# Patient Record
Sex: FEMALE | Race: WHITE | Employment: OTHER | URBAN - METROPOLITAN AREA
[De-identification: names, ages, dates, MRNs, and addresses within clinical notes are randomized per-mention and may not be internally consistent; named-entity substitution may affect disease eponyms.]

---

## 2019-10-14 NOTE — TELEPHONE ENCOUNTER
ONCOLOGY INTAKE: Records Information      APPT INFORMATION:  Referring provider:  Dr. Nell Palacio  Referring provider s clinic:  [facility]  Reason for visit/diagnosis:  Malignant neoplasm of urinary bladder  Has patient been notified of appointment date and time?: yes, per pt    RECORDS INFORMATION:  Were the records received with the referral (via [employer])? no    Has patient been seen for any external appt for this diagnosis? yes    If yes, where? [facility]    Has patient had any imaging or procedures outside of [place] for this condition? yes      If Yes, where? [facility]    ADDITIONAL INFORMATION:  Notes care everywhere

## 2019-11-01 ENCOUNTER — OFFICE VISIT (OUTPATIENT)
Dept: UROLOGY | Facility: CLINIC | Age: 79
End: 2019-11-01
Payer: COMMERCIAL

## 2019-11-01 VITALS
DIASTOLIC BLOOD PRESSURE: 60 MMHG | SYSTOLIC BLOOD PRESSURE: 150 MMHG | BODY MASS INDEX: 21.01 KG/M2 | HEIGHT: 60 IN | WEIGHT: 107 LBS | HEART RATE: 86 BPM | OXYGEN SATURATION: 97 %

## 2019-11-01 DIAGNOSIS — C67.9 MALIGNANT NEOPLASM OF URINARY BLADDER, UNSPECIFIED SITE (H): Primary | ICD-10-CM

## 2019-11-01 PROBLEM — I77.9 CAROTID ARTERY DISEASE (H): Status: ACTIVE | Noted: 2018-10-22

## 2019-11-01 LAB
ALBUMIN UR-MCNC: NEGATIVE MG/DL
APPEARANCE UR: CLEAR
BILIRUB UR QL STRIP: NEGATIVE
COLOR UR AUTO: YELLOW
GLUCOSE UR STRIP-MCNC: NEGATIVE MG/DL
HGB UR QL STRIP: NEGATIVE
KETONES UR STRIP-MCNC: NEGATIVE MG/DL
LEUKOCYTE ESTERASE UR QL STRIP: NEGATIVE
NITRATE UR QL: NEGATIVE
PH UR STRIP: 5.5 PH (ref 5–7)
SOURCE: NORMAL
SP GR UR STRIP: 1.01 (ref 1–1.03)
UROBILINOGEN UR STRIP-ACNC: 0.2 EU/DL (ref 0.2–1)

## 2019-11-01 PROCEDURE — 81003 URINALYSIS AUTO W/O SCOPE: CPT | Performed by: UROLOGY

## 2019-11-01 PROCEDURE — 99204 OFFICE O/P NEW MOD 45 MIN: CPT | Performed by: UROLOGY

## 2019-11-01 PROCEDURE — 88112 CYTOPATH CELL ENHANCE TECH: CPT | Performed by: UROLOGY

## 2019-11-01 RX ORDER — FLUTICASONE PROPIONATE 50 MCG
2 SPRAY, SUSPENSION (ML) NASAL
COMMUNITY
Start: 2019-09-04

## 2019-11-01 RX ORDER — CLOPIDOGREL BISULFATE 75 MG/1
75 TABLET ORAL
COMMUNITY
Start: 2019-06-25

## 2019-11-01 RX ORDER — ATORVASTATIN CALCIUM 40 MG/1
40 TABLET, FILM COATED ORAL
COMMUNITY
Start: 2018-07-27

## 2019-11-01 RX ORDER — OMEPRAZOLE 10 MG/1
10 CAPSULE, DELAYED RELEASE ORAL
COMMUNITY
Start: 2015-02-17

## 2019-11-01 RX ORDER — SPIRONOLACTONE 25 MG/1
25 TABLET ORAL
COMMUNITY
Start: 2018-10-22

## 2019-11-01 RX ORDER — DIPHENOXYLATE HYDROCHLORIDE AND ATROPINE SULFATE 2.5; .025 MG/1; MG/1
1 TABLET ORAL
COMMUNITY
Start: 2012-12-07

## 2019-11-01 RX ORDER — IRBESARTAN 150 MG/1
150 TABLET ORAL
COMMUNITY
Start: 2019-07-18

## 2019-11-01 RX ORDER — ALBUTEROL SULFATE 0.83 MG/ML
2.5 SOLUTION RESPIRATORY (INHALATION)
COMMUNITY
Start: 2018-06-12

## 2019-11-01 RX ORDER — POTASSIUM CHLORIDE 750 MG/1
10 TABLET, EXTENDED RELEASE ORAL 2 TIMES DAILY
COMMUNITY
Start: 2019-05-28

## 2019-11-01 RX ORDER — OXYCODONE AND ACETAMINOPHEN 5; 325 MG/1; MG/1
1 TABLET ORAL EVERY 6 HOURS PRN
COMMUNITY
Start: 2019-01-02

## 2019-11-01 SDOH — HEALTH STABILITY: MENTAL HEALTH: HOW OFTEN DO YOU HAVE A DRINK CONTAINING ALCOHOL?: NEVER

## 2019-11-01 ASSESSMENT — PAIN SCALES - GENERAL: PAINLEVEL: NO PAIN (0)

## 2019-11-01 ASSESSMENT — PATIENT HEALTH QUESTIONNAIRE - PHQ9: SUM OF ALL RESPONSES TO PHQ QUESTIONS 1-9: 10

## 2019-11-01 ASSESSMENT — MIFFLIN-ST. JEOR: SCORE: 871.85

## 2019-11-01 NOTE — NURSING NOTE
Chief Complaint   Patient presents with     Other     patient is here to discuss malignant bladder neoplasm.      Vanessa Sahu, CMA

## 2019-11-01 NOTE — PROGRESS NOTES
Chief Complaint:   Bladder Cancer         Consult or Referral:     Mr. Mireille Velázquez is a 79 year old female seen at the request of Dr. Palacio.         History of Present Illness:    Mireille Velázquez is a very pleasant 79 year old female who presents with a history of bladder cancer. She has a long history of this disease, initially diagnosed with 2-3 cm bladder tumor by Dr. Duran in 2010. Had non-invasive disease and treated with intravesical mitomycin. Subsequently had several recurrences and treated with induction BCG, and what patient describes as maintenance BCG.  In 2016, had tumor recur at left UVJ. More recently, was noted in May 2018 to have recurrent bladder tumors which were HG Ta. Also had left ureteroscopy which demonstrated LG Ta tumor in the lower pole. Induction BCG completed 11/2018. She has since gotten opinions from [facility], and has followed with Dr. Steele.    Most recently, had TURBT 8/2019 with HG Ta tumors in the bladder, and low-grade tumor in lower pole of left kidney. Has not had further intravesical treatments since that time. Dr. Steele was recommending further TURBT and left ureteroscopy, but patient has not had follow-up since 9/4/2019.    She reports no recent hematuria, dysuria, abdominal pain, or flank pain.         Past Medical History:     Past Medical History:   Diagnosis Date     Mumps     as child    Bladder Cancer  CHF  CAD  HTN  COPD  TIA  Prolonged QT syndrome         Past Surgical History:     Past Surgical History:   Procedure Laterality Date     BLADDER SURGERY       CYSTOSCOPY     appendectomy  Hysterectomy  Rotator cuff repair         Medications     Current Outpatient Medications   Medication     albuterol (PROVENTIL) (2.5 MG/3ML) 0.083% neb solution     aspirin (ASA) 325 MG EC tablet     atorvastatin (LIPITOR) 40 MG tablet     carboxymethylcellul-glycerin (OPTIVE) 0.5-0.9 % SOLN ophthalmic solution     Cholecalciferol (VITAMIN D3) 25 MCG (1000 UT)  CAPS     clopidogrel (PLAVIX) 75 MG tablet     fluticasone (FLONASE) 50 MCG/ACT nasal spray     irbesartan (AVAPRO) 150 MG tablet     magnesium chloride 535 (64 Mg) MG TBEC CR tablet     Multiple Vitamin (MULTI-VITAMINS) TABS     nebulizer nebulization     omeprazole (PRILOSEC) 10 MG DR capsule     oxyCODONE-acetaminophen (PERCOCET) 5-325 MG tablet     potassium chloride ER (K-TAB/KLOR-CON) 10 MEQ CR tablet     spironolactone (ALDACTONE) 25 MG tablet     No current facility-administered medications for this visit.           Family History:   Mother with bladder cancer  No other known  malignancy         Social History:     Social History     Socioeconomic History     Marital status: Single     Spouse name: Not on file     Number of children: Not on file     Years of education: Not on file     Highest education level: Not on file   Occupational History     Not on file   Social Needs     Financial resource strain: Not on file     Food insecurity:     Worry: Not on file     Inability: Not on file     Transportation needs:     Medical: Not on file     Non-medical: Not on file   Tobacco Use     Smoking status: Current Every Day Smoker     Packs/day: 0.00     Smokeless tobacco: Never Used   Substance and Sexual Activity     Alcohol use: Never     Frequency: Never     Drug use: Never     Sexual activity: Not on file   Lifestyle     Physical activity:     Days per week: Not on file     Minutes per session: Not on file     Stress: Not on file   Relationships     Social connections:     Talks on phone: Not on file     Gets together: Not on file     Attends [employer] service: Not on file     Active member of club or organization: Not on file     Attends meetings of clubs or organizations: Not on file     Relationship status: Not on file     Intimate partner violence:     Fear of current or ex partner: Not on file     Emotionally abused: Not on file     Physically abused: Not on file     Forced sexual activity: Not on file    Other Topics Concern     Parent/sibling w/ CABG, MI or angioplasty before 65F 55M? Not Asked   Social History Narrative     Not on file          Allergies:   Augmentin and Doxycycline         Review of Systems:  From intake questionnaire     Skin: negative  Eyes: negative  Ears/Nose/Throat: negative  Respiratory: No shortness of breath, dyspnea on exertion, cough, or hemoptysis  Cardiovascular: No chest pain or palpitations  Gastrointestinal: negative; no nausea/vomiting, constipation or diarrhea  Genitourinary: as per HPI  Musculoskeletal: negative  Neurologic: negative  Psychiatric: negative  Hematologic/Lymphatic/Immunologic: negative  Endocrine: negative         Physical Exam:     Patient is a 79 year old  female   Vitals: Blood pressure (!) 150/60, pulse 86, height 1.524 m (5'), weight 48.5 kg (107 lb), SpO2 97 %.  Constitutional: Body mass index is 20.9 kg/m .  Alert, no acute distress, oriented, conversant  Eyes: no scleral icterus; extraocular muscles intact, moist conjunctivae  Neck: trachea midline, no thyromegaly  Ears/nose/mouth: throat/mouth:normal, good dentition  Respiratory: no respiratory distress, or pursed lip breathing  Cardiovascular: pulses strong and intact; no obvious jugular venous distension present  Gastrointestinal: soft, nontender, no organomegaly or masses,   Lymphatics: No inguinal adenopathy  Musculoskeletal: extremities normal, no peripheral edema  Skin: no suspicious lesions or rashes  Neuro: Alert, oriented, speech and mentation normal  Psych: affect and mood normal, alert and oriented to person, place and time  Gait: Normal      Labs and Pathology:    I reviewed all applicable laboratory and pathology data and went over findings with patient  Significant for   Lab Results   Component Value Date    CR 0.75 12/18/2005       Imaging:    I reviewed all applicable imaging and went over findings with patient.    CT Urogram 6/6/2019  IMPRESSION:  Since the CT urogram of 2/1/2019, a 4mm  polypoid enhancing lesion  has developed in the right posterior bladder near the right ureterovesical  junction worrisome for a small urothelial neoplasm. Correlation with cystoscopic  findings will be helpful in further evaluation. The intrarenal collecting  systems and ureters remain normal.      Outside and Past Medical records:    I spent 10 minutes reviewing outside and past medical records.         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 Has previously had BCG, but last was approximately 1 year ago. We discussed this today and options for ongoing treatment. At this point, recommend CT urogram and office cystoscopy to assess her current status of disease. She may require TURBT and/or ureteroscopy pending these results. We will also work to obtain more detailed records of her BCG history, as she may benefit from additional BCG, but may also be a candidate for clinical trial of systemic immunotherapy. All of this was discussed in detail today. She and her daughter-in-law are in agreement with plan.    Plan:  CT Urogram  Obtain outside records  Follow-up with office cystoscopy     Orders  Orders Placed This Encounter   Procedures     CT Urogram wo & w Contrast     UA without Microscopic [[id number]]     Cytology non gyn [[id number]]     Hemanth Davila MD  Urology  [facility]  Clinic Phone [phone]

## 2019-11-01 NOTE — LETTER
11/1/2019     RE: Mireille Velázquez  [address]  [address]  [place] [address]     Dear Colleague,    Thank you for referring your patient, Mireille Velázquez, to the [facility] [place] at [facility]. Please see a copy of my visit note below.          Chief Complaint:   Bladder Cancer         Consult or Referral:     Mr. Mireille Velázquez is a 79 year old female seen at the request of Dr. Palacio.         History of Present Illness:    Mireille Velázquez is a very pleasant 79 year old female who presents with a history of bladder cancer. She has a long history of this disease, initially diagnosed with 2-3 cm bladder tumor by Dr. Duran in 2010. Had non-invasive disease and treated with intravesical mitomycin. Subsequently had several recurrences and treated with induction BCG, and what patient describes as maintenance BCG.  In 2016, had tumor recur at left UVJ. More recently, was noted in May 2018 to have recurrent bladder tumors which were HG Ta. Also had left ureteroscopy which demonstrated LG Ta tumor in the lower pole. Induction BCG completed 11/2018. She has since gotten opinions from [facility], and has followed with Dr. Steele.    Most recently, had TURBT 8/2019 with HG Ta tumors in the bladder, and low-grade tumor in lower pole of left kidney. Has not had further intravesical treatments since that time. Dr. Steele was recommending further TURBT and left ureteroscopy, but patient has not had follow-up since 9/4/2019.    She reports no recent hematuria, dysuria, abdominal pain, or flank pain.         Past Medical History:     Past Medical History:   Diagnosis Date     Mumps     as child    Bladder Cancer  CHF  CAD  HTN  COPD  TIA  Prolonged QT syndrome         Past Surgical History:     Past Surgical History:   Procedure Laterality Date     BLADDER SURGERY       CYSTOSCOPY     appendectomy  Hysterectomy  Rotator cuff repair          Medications     Current Outpatient Medications   Medication     albuterol (PROVENTIL) (2.5 MG/3ML) 0.083% neb solution     aspirin (ASA) 325 MG EC tablet     atorvastatin (LIPITOR) 40 MG tablet     carboxymethylcellul-glycerin (OPTIVE) 0.5-0.9 % SOLN ophthalmic solution     Cholecalciferol (VITAMIN D3) 25 MCG (1000 UT) CAPS     clopidogrel (PLAVIX) 75 MG tablet     fluticasone (FLONASE) 50 MCG/ACT nasal spray     irbesartan (AVAPRO) 150 MG tablet     magnesium chloride 535 (64 Mg) MG TBEC CR tablet     Multiple Vitamin (MULTI-VITAMINS) TABS     nebulizer nebulization     omeprazole (PRILOSEC) 10 MG DR capsule     oxyCODONE-acetaminophen (PERCOCET) 5-325 MG tablet     potassium chloride ER (K-TAB/KLOR-CON) 10 MEQ CR tablet     spironolactone (ALDACTONE) 25 MG tablet     No current facility-administered medications for this visit.           Family History:   Mother with bladder cancer  No other known  malignancy         Social History:     Social History     Socioeconomic History     Marital status: Single     Spouse name: Not on file     Number of children: Not on file     Years of education: Not on file     Highest education level: Not on file   Occupational History     Not on file   Social Needs     Financial resource strain: Not on file     Food insecurity:     Worry: Not on file     Inability: Not on file     Transportation needs:     Medical: Not on file     Non-medical: Not on file   Tobacco Use     Smoking status: Current Every Day Smoker     Packs/day: 0.00     Smokeless tobacco: Never Used   Substance and Sexual Activity     Alcohol use: Never     Frequency: Never     Drug use: Never     Sexual activity: Not on file   Lifestyle     Physical activity:     Days per week: Not on file     Minutes per session: Not on file     Stress: Not on file   Relationships     Social connections:     Talks on phone: Not on file     Gets together: Not on file     Attends Nondenominational service: Not on file     Active member of  club or organization: Not on file     Attends meetings of clubs or organizations: Not on file     Relationship status: Not on file     Intimate partner violence:     Fear of current or ex partner: Not on file     Emotionally abused: Not on file     Physically abused: Not on file     Forced sexual activity: Not on file   Other Topics Concern     Parent/sibling w/ CABG, MI or angioplasty before 65F 55M? Not Asked   Social History Narrative     Not on file          Allergies:   Augmentin and Doxycycline         Review of Systems:  From intake questionnaire     Skin: negative  Eyes: negative  Ears/Nose/Throat: negative  Respiratory: No shortness of breath, dyspnea on exertion, cough, or hemoptysis  Cardiovascular: No chest pain or palpitations  Gastrointestinal: negative; no nausea/vomiting, constipation or diarrhea  Genitourinary: as per HPI  Musculoskeletal: negative  Neurologic: negative  Psychiatric: negative  Hematologic/Lymphatic/Immunologic: negative  Endocrine: negative         Physical Exam:     Patient is a 79 year old  female   Vitals: Blood pressure (!) 150/60, pulse 86, height 1.524 m (5'), weight 48.5 kg (107 lb), SpO2 97 %.  Constitutional: Body mass index is 20.9 kg/m .  Alert, no acute distress, oriented, conversant  Eyes: no scleral icterus; extraocular muscles intact, moist conjunctivae  Neck: trachea midline, no thyromegaly  Ears/nose/mouth: throat/mouth:normal, good dentition  Respiratory: no respiratory distress, or pursed lip breathing  Cardiovascular: pulses strong and intact; no obvious jugular venous distension present  Gastrointestinal: soft, nontender, no organomegaly or masses,   Lymphatics: No inguinal adenopathy  Musculoskeletal: extremities normal, no peripheral edema  Skin: no suspicious lesions or rashes  Neuro: Alert, oriented, speech and mentation normal  Psych: affect and mood normal, alert and oriented to person, place and time  Gait: Normal      Labs and Pathology:    I reviewed  all applicable laboratory and pathology data and went over findings with patient  Significant for   Lab Results   Component Value Date    CR 0.75 12/18/2005       Imaging:    I reviewed all applicable imaging and went over findings with patient.    CT Urogram 6/6/2019  IMPRESSION:  Since the CT urogram of 2/1/2019, a 4mm polypoid enhancing lesion  has developed in the right posterior bladder near the right ureterovesical  junction worrisome for a small urothelial neoplasm. Correlation with cystoscopic  findings will be helpful in further evaluation. The intrarenal collecting  systems and ureters remain normal.      Outside and Past Medical records:    I spent 10 minutes reviewing outside and past medical records.         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 Has previously had BCG, but last was approximately 1 year ago. We discussed this today and options for ongoing treatment. At this point, recommend CT urogram and office cystoscopy to assess her current status of disease. She may require TURBT and/or ureteroscopy pending these results. We will also work to obtain more detailed records of her BCG history, as she may benefit from additional BCG, but may also be a candidate for clinical trial of systemic immunotherapy. All of this was discussed in detail today. She and her daughter-in-law are in agreement with plan.    Plan:  CT Urogram  Obtain outside records  Follow-up with office cystoscopy     Orders  Orders Placed This Encounter   Procedures     CT Urogram wo & w Contrast     UA without Microscopic [[id number]]     Cytology non gyn [[id number]]     Hemanth Davila MD  Urology  [facility]  Clinic Phone [phone]    Again, thank you for allowing me to participate in the care of your patient.      Sincerely,    Hemanth Davila MD

## 2019-11-02 ENCOUNTER — HOSPITAL ENCOUNTER (OUTPATIENT)
Dept: CT IMAGING | Facility: CLINIC | Age: 79
Discharge: HOME | End: 2019-11-02
Attending: UROLOGY | Admitting: UROLOGY
Payer: COMMERCIAL

## 2019-11-02 DIAGNOSIS — C67.9 MALIGNANT NEOPLASM OF URINARY BLADDER, UNSPECIFIED SITE (H): ICD-10-CM

## 2019-11-02 LAB
CREAT BLD-MCNC: 0.7 MG/DL (ref 0.52–1.04)
GFR SERPL CREATININE-BSD FRML MDRD: 80 ML/MIN/{1.73_M2}

## 2019-11-02 PROCEDURE — 82565 ASSAY OF CREATININE: CPT

## 2019-11-02 PROCEDURE — 25000125 ZZHC RX 250: Performed by: UROLOGY

## 2019-11-02 PROCEDURE — 25000128 H RX IP 250 OP 636: Performed by: UROLOGY

## 2019-11-02 PROCEDURE — 74178 CT ABD&PLV WO CNTR FLWD CNTR: CPT

## 2019-11-02 RX ORDER — IOPAMIDOL 755 MG/ML
100 INJECTION, SOLUTION INTRAVASCULAR ONCE
Status: COMPLETED | OUTPATIENT
Start: 2019-11-02 | End: 2019-11-02

## 2019-11-02 RX ADMIN — IOPAMIDOL 100 ML: 755 INJECTION, SOLUTION INTRAVENOUS at 12:04

## 2019-11-02 RX ADMIN — SODIUM CHLORIDE 60 ML: 9 INJECTION, SOLUTION INTRAVENOUS at 12:04

## 2019-11-04 LAB — COPATH REPORT: NORMAL

## 2019-11-20 ENCOUNTER — PRE VISIT (OUTPATIENT)
Dept: UROLOGY | Facility: CLINIC | Age: 79
End: 2019-11-20

## 2019-12-19 ENCOUNTER — OFFICE VISIT (OUTPATIENT)
Dept: UROLOGY | Facility: CLINIC | Age: 79
End: 2019-12-19
Payer: COMMERCIAL

## 2019-12-19 VITALS
BODY MASS INDEX: 20.62 KG/M2 | SYSTOLIC BLOOD PRESSURE: 130 MMHG | OXYGEN SATURATION: 99 % | HEIGHT: 60 IN | WEIGHT: 105 LBS | DIASTOLIC BLOOD PRESSURE: 72 MMHG | HEART RATE: 74 BPM

## 2019-12-19 DIAGNOSIS — C67.8 MALIGNANT NEOPLASM OF OVERLAPPING SITES OF BLADDER (H): ICD-10-CM

## 2019-12-19 DIAGNOSIS — C67.9 MALIGNANT NEOPLASM OF URINARY BLADDER, UNSPECIFIED SITE (H): Primary | ICD-10-CM

## 2019-12-19 DIAGNOSIS — C67.9 BLADDER CANCER (H): Primary | ICD-10-CM

## 2019-12-19 LAB
ALBUMIN UR-MCNC: NEGATIVE MG/DL
APPEARANCE UR: CLEAR
BILIRUB UR QL STRIP: NEGATIVE
COLOR UR AUTO: YELLOW
GLUCOSE UR STRIP-MCNC: NEGATIVE MG/DL
HGB UR QL STRIP: NEGATIVE
KETONES UR STRIP-MCNC: NEGATIVE MG/DL
LEUKOCYTE ESTERASE UR QL STRIP: NEGATIVE
NITRATE UR QL: NEGATIVE
PH UR STRIP: 6 PH (ref 5–7)
SOURCE: NORMAL
SP GR UR STRIP: 1.02 (ref 1–1.03)
UROBILINOGEN UR STRIP-ACNC: 0.2 EU/DL (ref 0.2–1)

## 2019-12-19 PROCEDURE — 99213 OFFICE O/P EST LOW 20 MIN: CPT | Mod: 25 | Performed by: UROLOGY

## 2019-12-19 PROCEDURE — 81003 URINALYSIS AUTO W/O SCOPE: CPT | Mod: QW | Performed by: UROLOGY

## 2019-12-19 PROCEDURE — 52000 CYSTOURETHROSCOPY: CPT | Performed by: UROLOGY

## 2019-12-19 RX ORDER — LIDOCAINE HYDROCHLORIDE 20 MG/ML
10 JELLY TOPICAL
Status: CANCELLED | OUTPATIENT
Start: 2020-01-28

## 2019-12-19 RX ORDER — LIDOCAINE HYDROCHLORIDE 20 MG/ML
10 JELLY TOPICAL
Status: CANCELLED | OUTPATIENT
Start: 2020-01-14

## 2019-12-19 RX ORDER — LIDOCAINE HYDROCHLORIDE 20 MG/ML
10 JELLY TOPICAL
Status: CANCELLED | OUTPATIENT
Start: 2020-02-04

## 2019-12-19 RX ORDER — DIPHENHYDRAMINE HCL 50 MG
50 CAPSULE ORAL DAILY
COMMUNITY

## 2019-12-19 RX ORDER — LIDOCAINE HYDROCHLORIDE 20 MG/ML
10 JELLY TOPICAL
Status: CANCELLED | OUTPATIENT
Start: 2020-02-11

## 2019-12-19 RX ORDER — LIDOCAINE HYDROCHLORIDE 20 MG/ML
10 JELLY TOPICAL
Status: CANCELLED | OUTPATIENT
Start: 2020-01-07

## 2019-12-19 RX ORDER — LIDOCAINE HYDROCHLORIDE 20 MG/ML
10 JELLY TOPICAL
Status: CANCELLED | OUTPATIENT
Start: 2020-01-21

## 2019-12-19 ASSESSMENT — MIFFLIN-ST. JEOR: SCORE: 862.78

## 2019-12-19 ASSESSMENT — PAIN SCALES - GENERAL: PAINLEVEL: NO PAIN (0)

## 2019-12-19 NOTE — PATIENT INSTRUCTIONS
"AFTER YOUR CYSTOSCOPY  ?  ?  You have just completed a cystoscopy, or \"cysto\", which allowed your physician to learn more about your bladder (or to remove a stent placed after surgery). We suggest that you continue to avoid caffeine, fruit juice, and alcohol for the next 24 hours, however, you are encouraged to return to your normal activities.  ?  ?  A few things that are considered normal after your cystoscopy:  ?  * small amount of bleeding (or spotting) that clears within the next 24 hours  ?  * slight burning sensation with urination  ?  * sensation of needing to void (urinate) more frequently  ?  * the feeling of \"air\" in your urine  ?  * mild discomfort that is relieved with Tylenol    * bladder spasms  ?  ?  ?  Please contact our office promptly if you:  ?  * develop a fever above 101 degrees  ?  * are unable to urinate  ?  * develop bright red blood that does not stop  ?  * experience severe pain or swelling  ?  ?  ?  And of course, please contact our office with any concerns or questions [phone]  ?    AFTER YOUR CYSTOSCOPY        You have just completed a cystoscopy, or \"cysto\", which allowed your physician to learn more about your bladder (or to remove a stent placed after surgery). We suggest that you continue to avoid caffeine, fruit juice, and alcohol for the next 24 hours, however, you are encouraged to return to your normal activities.         A few things that are considered normal after your cystoscopy:     * Small amount of bleeding (or spotting) that clears within the next 24 hours     * Slight burning sensation with urination     * Sensation to of needing to avoid more frequently     * The feeling of \"air\" in your urine     * Mild discomfort that is relieved with Tylenol        Please contact our office promptly if you:     * Develop a fever above 101 degrees     * Are unable to urinate     * Develop bright red blood that does not stop     * Severe pain or swelling         Please contact " our office with any concerns or questions @[facility].

## 2019-12-19 NOTE — PROGRESS NOTES
CHIEF COMPLAINT   It was my pleasure to see Mireille Velázquez who is a 79 year old female for follow-up of Bladder Cancer.      HPI  Mireille Velázquez is a very pleasant 79 year old female who presents with a history of bladder cancer. She has a long history of this disease, initially diagnosed with 2-3 cm bladder tumor by Dr. Duran in 2010. Had non-invasive disease and treated with intravesical mitomycin. Subsequently had several recurrences and treated with induction BCG, and what patient describes as maintenance BCG.  May 2018 to have recurrent bladder tumors which were HG Ta. Also had left ureteroscopy which demonstrated LG Ta tumor in the lower pole. Induction BCG completed 11/2018. She has since gotten opinions from [facility], and has followed with Dr. Steele.     Most recently, had TURBT 8/2019 with HG Ta tumors in the bladder, and low-grade tumor in lower pole of left kidney. Has not had treatment following that procedure. Here for cystoscopy to assess current disease burden. No recent hematuria or dysuria.      Cytology 11/1/2019  CYTOLOGIC INTERPRETATION:      Urine, voided:   Negative for High-Grade Urothelial Carcinoma   Specimen Adequacy: Satisfactory for evaluation.    CT Urogram 11/2/2019  IMPRESSION:   1. No evidence for renal, ureteral, or bladder calculi.  2. No masses or suspicious filling defects within the opacified  urinary collecting system to suggest recurrent or residual malignancy.    PHYSICAL EXAM  Patient is a 79 year old  female   Vitals: Blood pressure 130/72, pulse 74, height 1.524 m (5'), weight 47.6 kg (105 lb), SpO2 99 %.  General Appearance Adult: Body mass index is 20.51 kg/m .  Alert, no acute distress, oriented  HENT: throat/mouth:normal, good dentition  Lungs: no respiratory distress, or pursed lip breathing  Heart: No obvious jugular venous distension present  Abdomen: soft, nontender, no organomegaly or masses  Back: no CVAT  Musculoskeltal: extremities normal, no  peripheral edema  Skin: no suspicious lesions or rashes  Neuro: Alert, oriented, speech and mentation normal  Psych: affect and mood normal  Gait: Normal    CYSTOSCOPY PROCEDURE 12/19/2019  Pre-procedure diagnosis:  Bladder cancer  Post-procedure diagnosis: Normal cystoscopy  Procedure performed:  Cystourethroscopy  Surgeon:    Hemanth Davila MD   Anesthesia:    Local    Description of procedure:   After fully informed, voluntary consent was obtained, the patient was brought into the procedure room, identified and placed in a dorsal lithotomy position on the cystoscopy table.  The vagina/introitus were prepped with betadine and draped in a sterile fashion. Urojet lidocaine gel was introduced.  A 15F flexible cystoscope was inserted into the urethra, and the bladder and urethra were examined in a systematic manner.  The patient tolerated the procedure well and there were no complications.      Findings:  External exam revealed no cystocele and no rectocele.   Cystoscopy then showed the urethra to be normal in appearance with normal coaptation. The bladder itself was completely surveyed.  The ureteric orifices were normal in position and number and effluxing clear urine.  There was mild trabeculation.  There were no neoplasms, stones, or diverticula identifed. Previous resection sites noted. No tumors or suspicious areas noted today.    ASSESSMENT and PLAN  79 year old female with history of HG Ta urothelial carcinoma of the bladder in 8/2019. Also with history of LG Ta of the left lower pole calyx. No lesions visible on cysto today, no lesions on CT urogram, and cytology negative. Imaging and her results were reviewed with her in detail today. We had a long discussion about her options today. Given no current symptoms and negative cytology with history of low grade pathology, would recommend observation of left kidney for now. Will likely plan surveillance ureteroscopy in 3-6 months.  Regarding her bladder, it has  been over a year since she had any BCG and would be reasonable to try another induction course given the HG Ta tumors on her last resection. She agrees with this and will schedule in the near future.     - Induction BCG  - Follow-up 3-4 months for office cystoscopy and cytology     I spent over 15 minutes with the patient.  Over half this time was spent on counseling regarding bladder cancer.    Hemanth Davila MD  Urology  [facility]

## 2019-12-19 NOTE — NURSING NOTE
Chief Complaint   Patient presents with     Cystoscopy     history of bladder cancer   Prior to the start of the procedure and with procedural staff participation, I verbally confirmed the patient s identity using two indicators, relevant allergies, that the procedure was appropriate and matched the consent or emergent situation, and that the correct equipment/implants were available. Immediately prior to starting the procedure I conducted the Time Out with the procedural staff and re-confirmed the patient s name, procedure, and site/side. (The [facility] universal protocol was followed.)  Yes    Sedation (Moderate or Deep): None  Janine Bradley LPN

## 2019-12-20 ENCOUNTER — TELEPHONE (OUTPATIENT)
Dept: INFUSION THERAPY | Facility: CLINIC | Age: 79
End: 2019-12-20

## 2019-12-20 NOTE — TELEPHONE ENCOUNTER
Received call from Gabrielle at [facility]. Asked to call patient to schedule BCG 1:30PM OR after at [facility]. Patient will stop by Urology on floor 5 at [facility] just prior to our appt for the urine testing.  Left message for patient to call office.

## 2020-01-07 ENCOUNTER — INFUSION THERAPY VISIT (OUTPATIENT)
Dept: INFUSION THERAPY | Facility: CLINIC | Age: 80
End: 2020-01-07
Attending: UROLOGY
Payer: COMMERCIAL

## 2020-01-07 ENCOUNTER — TELEPHONE (OUTPATIENT)
Dept: ONCOLOGY | Facility: CLINIC | Age: 80
End: 2020-01-07

## 2020-01-07 VITALS
OXYGEN SATURATION: 99 % | DIASTOLIC BLOOD PRESSURE: 54 MMHG | RESPIRATION RATE: 18 BRPM | SYSTOLIC BLOOD PRESSURE: 127 MMHG | TEMPERATURE: 98.1 F | HEART RATE: 70 BPM

## 2020-01-07 DIAGNOSIS — C67.9 MALIGNANT NEOPLASM OF URINARY BLADDER, UNSPECIFIED SITE (H): Primary | ICD-10-CM

## 2020-01-07 DIAGNOSIS — C67.9 BLADDER CANCER (H): Primary | ICD-10-CM

## 2020-01-07 DIAGNOSIS — C67.9 BLADDER CANCER (H): ICD-10-CM

## 2020-01-07 LAB
ALBUMIN UR-MCNC: NEGATIVE MG/DL
APPEARANCE UR: CLEAR
BILIRUB UR QL STRIP: NEGATIVE
COLOR UR AUTO: YELLOW
GLUCOSE UR STRIP-MCNC: NEGATIVE MG/DL
HGB UR QL STRIP: ABNORMAL
KETONES UR STRIP-MCNC: NEGATIVE MG/DL
LEUKOCYTE ESTERASE UR QL STRIP: NEGATIVE
NITRATE UR QL: NEGATIVE
PH UR STRIP: 5.5 PH (ref 5–7)
SOURCE: ABNORMAL
SP GR UR STRIP: 1.02 (ref 1–1.03)
UROBILINOGEN UR STRIP-ACNC: 0.2 EU/DL (ref 0.2–1)

## 2020-01-07 PROCEDURE — 51720 TREATMENT OF BLADDER LESION: CPT

## 2020-01-07 PROCEDURE — 25000128 H RX IP 250 OP 636: Performed by: UROLOGY

## 2020-01-07 PROCEDURE — 81003 URINALYSIS AUTO W/O SCOPE: CPT | Performed by: UROLOGY

## 2020-01-07 PROCEDURE — 25000132 ZZH RX MED GY IP 250 OP 250 PS 637: Performed by: UROLOGY

## 2020-01-07 RX ORDER — LEVOFLOXACIN 500 MG/1
TABLET, FILM COATED ORAL
Qty: 12 TABLET | Refills: 0 | Status: SHIPPED | OUTPATIENT
Start: 2020-01-07

## 2020-01-07 RX ADMIN — BACILLUS CALMETTE-GUERIN 50 MG: 50 POWDER, FOR SUSPENSION INTRAVESICAL at 14:25

## 2020-01-07 RX ADMIN — Medication: at 14:21

## 2020-01-07 ASSESSMENT — PAIN SCALES - GENERAL: PAINLEVEL: NO PAIN (0)

## 2020-01-07 NOTE — TELEPHONE ENCOUNTER
[facility] Call [facility]    Phone Message    May a detailed message be left on voicemail: yes    Reason for Call: Other: Tracy from the [facility] calling to clarify which antibiotic Mirelile should have. Mireille was prescribed levaquin, but Mireille told Tracy she has always taken cipro. Please give Tracy a call back at [phone] at your earliest convenience to clarify.     Action Taken: Message routed to:  Other: [facility]

## 2020-01-07 NOTE — PROGRESS NOTES
Infusion Nursing Note:  Mireille Velázquez presents today for BCG Bladder instillation. Instillation number 1 of 6.   Patient seen by provider today: No   present during visit today: Not Applicable.    Note: Patient states she has had BCG bladder instillations in the past and tolerated well. She denies fevers, visible hematuria or any other urinary symptoms. Patient is questioning the take home levaquin prescription, states she has previously been on cipro for all of her procedures. Call out to Dr. Davila. Received return call from CINDY Fam at Dr. Davila's office, patient to take levaquin as ordered. Order sent to patient's pharmacy. Reviewed at home post-BCG instructions with patient, verbalized understanding.    Treatment Conditions:   Patient states no concerns or issues at this time.  Ok to proceed with treatment.     Labs Reviewed:  Urine analysis.  Results meet treatment conditions.     Procedure:  14F Straight catheter placed.   Catheter placed without trauma.  Clear/yellow urine drained from bladder.    Patient turned every 15 minutes per protocol: Yes, turning to be completed at home per protocol.   Patient tolerated instillation without incident.      Discharge Plan:   Discharge instructions reviewed with: Patient.  Patient and/or family verbalized understanding of discharge instructions and all questions answered.  Copy of AVS reviewed with patient and/or family.  Patient will return 1/14/20 for next appointment.  Patient discharged in stable condition accompanied by: self.  Departure Mode: Ambulatory.  Face to Face time: 15 minutes.    Belen Rosas RN

## 2020-01-07 NOTE — PATIENT INSTRUCTIONS
BCG was instilled into your bladder. For the next 2 hours, lie on your back for 15 min., then lie on your left side for 15 min., then lie on your right side for 15 min. Then you may sit up then, but keep the medication in your bladder for 60 more minutes for a total of 2 hours. After 2 hours, empty your bladder. After urinating (sit for this, don't stand), add 2 cups undiluted chlorine bleach to the toilet, close the lid, and flush after 15 min. Repeat this each time you urinate for the next 6 hours. Drink plenty of water today to flush any remaining BCG out of your bladder. If you were given a prescription for an antibiotic, take it as prescribed. Report any fevers to your doctor.

## 2020-01-14 ENCOUNTER — INFUSION THERAPY VISIT (OUTPATIENT)
Dept: INFUSION THERAPY | Facility: CLINIC | Age: 80
End: 2020-01-14
Attending: UROLOGY
Payer: COMMERCIAL

## 2020-01-14 VITALS
HEART RATE: 80 BPM | TEMPERATURE: 98.2 F | SYSTOLIC BLOOD PRESSURE: 160 MMHG | DIASTOLIC BLOOD PRESSURE: 81 MMHG | RESPIRATION RATE: 16 BRPM

## 2020-01-14 DIAGNOSIS — C67.9 BLADDER CANCER (H): Primary | ICD-10-CM

## 2020-01-14 DIAGNOSIS — C67.9 MALIGNANT NEOPLASM OF URINARY BLADDER, UNSPECIFIED SITE (H): Primary | ICD-10-CM

## 2020-01-14 DIAGNOSIS — C67.9 BLADDER CANCER (H): ICD-10-CM

## 2020-01-14 LAB
ALBUMIN UR-MCNC: NEGATIVE MG/DL
APPEARANCE UR: CLEAR
BILIRUB UR QL STRIP: NEGATIVE
COLOR UR AUTO: YELLOW
GLUCOSE UR STRIP-MCNC: NEGATIVE MG/DL
HGB UR QL STRIP: NEGATIVE
KETONES UR STRIP-MCNC: NEGATIVE MG/DL
LEUKOCYTE ESTERASE UR QL STRIP: NEGATIVE
NITRATE UR QL: NEGATIVE
PH UR STRIP: 6 PH (ref 5–7)
SOURCE: NORMAL
SP GR UR STRIP: 1.01 (ref 1–1.03)
UROBILINOGEN UR STRIP-ACNC: 0.2 EU/DL (ref 0.2–1)

## 2020-01-14 PROCEDURE — 25000128 H RX IP 250 OP 636: Performed by: UROLOGY

## 2020-01-14 PROCEDURE — 51720 TREATMENT OF BLADDER LESION: CPT

## 2020-01-14 PROCEDURE — 81003 URINALYSIS AUTO W/O SCOPE: CPT | Performed by: UROLOGY

## 2020-01-14 RX ADMIN — BACILLUS CALMETTE-GUERIN 50 MG: 50 POWDER, FOR SUSPENSION INTRAVESICAL at 13:32

## 2020-01-14 ASSESSMENT — PAIN SCALES - GENERAL: PAINLEVEL: NO PAIN (0)

## 2020-01-14 NOTE — PROGRESS NOTES
Infusion Nursing Note:  Mireille Velázquez presents today for BCG.  Instillation number 2 of 6.   Patient seen by provider today: No   present during visit today: Not Applicable.    Note: Pt reports she tolerated the previous treatment of BCG without issues.  Pt reports she has levaquin at home to take 6 hours after.    Treatment Conditions:   Patient states no concerns or issues at this time.  Ok to proceed with treatment.     Labs Reviewed:  Urine analysis.  Results meet treatment conditions.     Procedure:  14F Straight catheter placed.   Catheter placed without trauma.  Clear/yellow urine drained from bladder.    Patient tolerated instillation without incident.      Discharge Plan:   Patient declined prescription refills.  Discharge instructions reviewed with: Patient.  Patient and/or family verbalized understanding of discharge instructions and all questions answered.  Copy of AVS reviewed with patient and/or family.  Patient will return 1/21/2020 for next appointment.  Patient discharged in stable condition accompanied by: self.  Departure Mode: Ambulatory.    Kathleen Guillen, RN, RN

## 2020-01-21 ENCOUNTER — INFUSION THERAPY VISIT (OUTPATIENT)
Dept: INFUSION THERAPY | Facility: CLINIC | Age: 80
End: 2020-01-21
Attending: UROLOGY
Payer: COMMERCIAL

## 2020-01-21 VITALS
RESPIRATION RATE: 18 BRPM | TEMPERATURE: 98.4 F | HEART RATE: 69 BPM | DIASTOLIC BLOOD PRESSURE: 60 MMHG | SYSTOLIC BLOOD PRESSURE: 141 MMHG

## 2020-01-21 DIAGNOSIS — C67.9 BLADDER CANCER (H): Primary | ICD-10-CM

## 2020-01-21 DIAGNOSIS — C67.9 MALIGNANT NEOPLASM OF URINARY BLADDER, UNSPECIFIED SITE (H): Primary | ICD-10-CM

## 2020-01-21 PROCEDURE — 25000128 H RX IP 250 OP 636: Performed by: UROLOGY

## 2020-01-21 PROCEDURE — 25000132 ZZH RX MED GY IP 250 OP 250 PS 637: Performed by: UROLOGY

## 2020-01-21 PROCEDURE — 81003 URINALYSIS AUTO W/O SCOPE: CPT | Performed by: UROLOGY

## 2020-01-21 PROCEDURE — 51720 TREATMENT OF BLADDER LESION: CPT

## 2020-01-21 RX ADMIN — Medication: at 14:18

## 2020-01-21 RX ADMIN — BACILLUS CALMETTE-GUERIN 50 MG: 50 POWDER, FOR SUSPENSION INTRAVESICAL at 14:18

## 2020-01-28 ENCOUNTER — INFUSION THERAPY VISIT (OUTPATIENT)
Dept: INFUSION THERAPY | Facility: CLINIC | Age: 80
End: 2020-01-28
Attending: UROLOGY
Payer: COMMERCIAL

## 2020-01-28 VITALS
RESPIRATION RATE: 20 BRPM | TEMPERATURE: 97.8 F | DIASTOLIC BLOOD PRESSURE: 69 MMHG | HEART RATE: 60 BPM | SYSTOLIC BLOOD PRESSURE: 162 MMHG

## 2020-01-28 DIAGNOSIS — C67.9 MALIGNANT NEOPLASM OF URINARY BLADDER, UNSPECIFIED SITE (H): Primary | ICD-10-CM

## 2020-01-28 DIAGNOSIS — C67.9 BLADDER CANCER (H): ICD-10-CM

## 2020-01-28 DIAGNOSIS — C67.9 BLADDER CANCER (H): Primary | ICD-10-CM

## 2020-01-28 LAB
ALBUMIN UR-MCNC: NEGATIVE MG/DL
APPEARANCE UR: CLEAR
BILIRUB UR QL STRIP: NEGATIVE
COLOR UR AUTO: YELLOW
GLUCOSE UR STRIP-MCNC: NEGATIVE MG/DL
HGB UR QL STRIP: NEGATIVE
KETONES UR STRIP-MCNC: NEGATIVE MG/DL
LEUKOCYTE ESTERASE UR QL STRIP: NEGATIVE
NITRATE UR QL: NEGATIVE
PH UR STRIP: 5.5 PH (ref 5–7)
SOURCE: NORMAL
SP GR UR STRIP: 1.02 (ref 1–1.03)
UROBILINOGEN UR STRIP-ACNC: 0.2 EU/DL (ref 0.2–1)

## 2020-01-28 PROCEDURE — 51720 TREATMENT OF BLADDER LESION: CPT

## 2020-01-28 PROCEDURE — 25000128 H RX IP 250 OP 636: Performed by: UROLOGY

## 2020-01-28 PROCEDURE — 81003 URINALYSIS AUTO W/O SCOPE: CPT | Performed by: UROLOGY

## 2020-01-28 RX ADMIN — BACILLUS CALMETTE-GUERIN 50 MG: 50 POWDER, FOR SUSPENSION INTRAVESICAL at 14:12

## 2020-01-28 NOTE — PROGRESS NOTES
Infusion Nursing Note:  Mireille Velázquez presents today for BCG.  Instillation number 4 of 6.   Patient seen by provider today: No   present during visit today: Not Applicable.    Note: Patient denies any concerns or new issues today.    Patient's primary MD interested in starting pt on a prednisone taper for pain in her left heal after injury with the car door.  After speaking to [facility] pharmacist, he recommends to wait 7-14 days after her last instillation before any high dose steroid taper due to possible disseminated BCG infection.  Info given to patient.      Treatment Conditions:   Patient states no concerns or issues at this time.  Ok to proceed with treatment.     Labs Reviewed:  Urine analysis.  Results meet treatment conditions.     Procedure:  14F Straight catheter placed.   Catheter placed without trauma.  Clear/yellow urine drained from bladder.    Patient turned every 15 minutes per protocol: Yes, turning to be completed at home per protocol.   Patient tolerated instillation without incident.      Discharge Plan:   Patient declined prescription refills.  Patient states she has levaquin at home.  Discharge instructions reviewed with: Patient.  Patient and/or family verbalized understanding of discharge instructions and all questions answered.  Copy of AVS reviewed with patient and/or family.  Patient will return 2/4/20 for next appointment.  Patient discharged in stable condition accompanied by: self.  Departure Mode: Ambulatory.    Leonard Ortiz, RN, RN

## 2020-02-04 ENCOUNTER — INFUSION THERAPY VISIT (OUTPATIENT)
Dept: INFUSION THERAPY | Facility: CLINIC | Age: 80
End: 2020-02-04
Attending: UROLOGY
Payer: COMMERCIAL

## 2020-02-04 VITALS
RESPIRATION RATE: 16 BRPM | HEART RATE: 67 BPM | SYSTOLIC BLOOD PRESSURE: 116 MMHG | TEMPERATURE: 97.8 F | DIASTOLIC BLOOD PRESSURE: 65 MMHG

## 2020-02-04 DIAGNOSIS — C67.9 MALIGNANT NEOPLASM OF URINARY BLADDER, UNSPECIFIED SITE (H): Primary | ICD-10-CM

## 2020-02-04 DIAGNOSIS — C67.9 BLADDER CANCER (H): Primary | ICD-10-CM

## 2020-02-04 DIAGNOSIS — C67.9 BLADDER CANCER (H): ICD-10-CM

## 2020-02-04 PROCEDURE — 81003 URINALYSIS AUTO W/O SCOPE: CPT | Performed by: UROLOGY

## 2020-02-04 PROCEDURE — 25000128 H RX IP 250 OP 636: Performed by: UROLOGY

## 2020-02-04 PROCEDURE — 51720 TREATMENT OF BLADDER LESION: CPT

## 2020-02-04 RX ADMIN — BACILLUS CALMETTE-GUERIN 50 MG: 50 POWDER, FOR SUSPENSION INTRAVESICAL at 14:09

## 2020-02-04 ASSESSMENT — PAIN SCALES - GENERAL: PAINLEVEL: NO PAIN (0)

## 2020-02-04 NOTE — PROGRESS NOTES
Infusion Nursing Note:  Mireille Velázquez presents today for BCG.  Instillation number 5 of 6.   Patient seen by provider today: No   present during visit today: Not Applicable.    Note: N/A.    Treatment Conditions:   Patient states no concerns or issues at this time.  Ok to proceed with treatment.     Labs Reviewed:  Urine analysis.  Results meet treatment conditions.     Procedure:  14F Straight catheter placed.   Catheter placed without trauma.  Clear/yellow urine drained from bladder.    Patient will turn every 15 minutes per protocol: Yes, turning to be completed at home per protocol.   Patient tolerated instillation without incident.      Discharge Plan:   Discharge instructions reviewed with: Patient.  Patient and/or family verbalized understanding of discharge instructions and all questions answered.  Copy of AVS reviewed with patient and/or family.  Patient will return 2/11/20 for next appointment.  Patient discharged in stable condition accompanied by: self.  Departure Mode: Ambulatory.    Zee Hernandez, RN, RN

## 2020-02-11 ENCOUNTER — INFUSION THERAPY VISIT (OUTPATIENT)
Dept: INFUSION THERAPY | Facility: CLINIC | Age: 80
End: 2020-02-11
Attending: UROLOGY
Payer: COMMERCIAL

## 2020-02-11 VITALS
SYSTOLIC BLOOD PRESSURE: 131 MMHG | TEMPERATURE: 97.9 F | DIASTOLIC BLOOD PRESSURE: 57 MMHG | RESPIRATION RATE: 20 BRPM | OXYGEN SATURATION: 99 % | HEART RATE: 69 BPM

## 2020-02-11 DIAGNOSIS — C67.9 MALIGNANT NEOPLASM OF URINARY BLADDER, UNSPECIFIED SITE (H): Primary | ICD-10-CM

## 2020-02-11 DIAGNOSIS — C67.9 BLADDER CANCER (H): Primary | ICD-10-CM

## 2020-02-11 PROCEDURE — 51720 TREATMENT OF BLADDER LESION: CPT

## 2020-02-11 PROCEDURE — 25000128 H RX IP 250 OP 636: Performed by: UROLOGY

## 2020-02-11 PROCEDURE — 81003 URINALYSIS AUTO W/O SCOPE: CPT | Performed by: UROLOGY

## 2020-02-11 RX ADMIN — BACILLUS CALMETTE-GUERIN 50 MG: 50 POWDER, FOR SUSPENSION INTRAVESICAL at 14:09

## 2020-02-11 ASSESSMENT — PAIN SCALES - GENERAL: PAINLEVEL: NO PAIN (0)

## 2020-02-11 NOTE — PROGRESS NOTES
Infusion Nursing Note:  Mireille Velázquez presents today for BCG.  Instillation number 6 of 6.   Patient seen by provider today: No   present during visit today: Not Applicable.    Note: Pt has had several BCGs in the past and is familiar with post infusion instructions.     Treatment Conditions:   Patient states no concerns or issues at this time.  Ok to proceed with treatment.     Labs Reviewed:  Urine analysis.    Procedure:  14F Straight catheter placed.   Catheter placed without trauma.  Clear/yellow urine drained from bladder.    Patient turned every 15 minutes per protocol: Yes, turning to be completed at home per protocol.   Patient tolerated instillation without incident.      Discharge Plan:   Patient declined prescription refills.  Discharge instructions reviewed with: Patient.  Patient verbalized understanding of discharge instructions and all questions answered.  Copy of AVS reviewed with patient.  Patient will return as scheduled for next appointment.  Patient discharged in stable condition accompanied by: self.  Departure Mode: Ambulatory.    Radha Corral RN

## 2020-10-07 ENCOUNTER — MEDICAL CORRESPONDENCE (OUTPATIENT)
Dept: HEALTH INFORMATION MANAGEMENT | Facility: CLINIC | Age: 80
End: 2020-10-07

## 2020-10-20 ENCOUNTER — TRANSCRIBE ORDERS (OUTPATIENT)
Dept: OTHER | Age: 80
End: 2020-10-20

## 2020-10-20 DIAGNOSIS — H92.02 LEFT EAR PAIN: Primary | ICD-10-CM
